# Patient Record
Sex: MALE | Race: WHITE | URBAN - METROPOLITAN AREA
[De-identification: names, ages, dates, MRNs, and addresses within clinical notes are randomized per-mention and may not be internally consistent; named-entity substitution may affect disease eponyms.]

---

## 2017-03-09 ENCOUNTER — EMERGENCY (EMERGENCY)
Facility: HOSPITAL | Age: 52
LOS: 1 days | Discharge: HOME | End: 2017-03-09
Attending: EMERGENCY MEDICINE | Admitting: EMERGENCY MEDICINE
Payer: COMMERCIAL

## 2017-03-09 VITALS
SYSTOLIC BLOOD PRESSURE: 188 MMHG | HEART RATE: 96 BPM | DIASTOLIC BLOOD PRESSURE: 114 MMHG | WEIGHT: 225.31 LBS | OXYGEN SATURATION: 99 % | RESPIRATION RATE: 20 BRPM | TEMPERATURE: 99 F

## 2017-03-09 VITALS
RESPIRATION RATE: 18 BRPM | TEMPERATURE: 98 F | SYSTOLIC BLOOD PRESSURE: 198 MMHG | HEART RATE: 92 BPM | OXYGEN SATURATION: 97 % | DIASTOLIC BLOOD PRESSURE: 99 MMHG

## 2017-03-09 DIAGNOSIS — M79.1 MYALGIA: ICD-10-CM
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
## 2017-03-09 DIAGNOSIS — T23.231A BURN OF SECOND DEGREE OF MULTIPLE RIGHT FINGERS (NAIL), NOT INCLUDING THUMB, INITIAL ENCOUNTER: ICD-10-CM
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
## 2017-03-09 DIAGNOSIS — M79.641 PAIN IN RIGHT HAND: ICD-10-CM
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
## 2017-03-09 DIAGNOSIS — R73.9 HYPERGLYCEMIA, UNSPECIFIED: ICD-10-CM

## 2017-03-09 LAB
ALBUMIN SERPL ELPH-MCNC: 5.1 G/DL — HIGH (ref 3.3–5)
ALP SERPL-CCNC: 73 U/L — SIGNIFICANT CHANGE UP (ref 40–120)
ALT FLD-CCNC: 57 U/L RC — HIGH (ref 10–45)
ANION GAP SERPL CALC-SCNC: 16 MMOL/L — SIGNIFICANT CHANGE UP (ref 5–17)
AST SERPL-CCNC: 28 U/L — SIGNIFICANT CHANGE UP (ref 10–40)
BASOPHILS # BLD AUTO: 0 K/UL — SIGNIFICANT CHANGE UP (ref 0–0.2)
BASOPHILS NFR BLD AUTO: 0.4 % — SIGNIFICANT CHANGE UP (ref 0–2)
BILIRUB SERPL-MCNC: 0.4 MG/DL — SIGNIFICANT CHANGE UP (ref 0.2–1.2)
BUN SERPL-MCNC: 16 MG/DL — SIGNIFICANT CHANGE UP (ref 7–23)
CALCIUM SERPL-MCNC: 10.1 MG/DL — SIGNIFICANT CHANGE UP (ref 8.4–10.5)
CHLORIDE SERPL-SCNC: 100 MMOL/L — SIGNIFICANT CHANGE UP (ref 96–108)
CK SERPL-CCNC: 107 U/L — SIGNIFICANT CHANGE UP (ref 30–200)
CO2 SERPL-SCNC: 24 MMOL/L — SIGNIFICANT CHANGE UP (ref 22–31)
CREAT SERPL-MCNC: 0.79 MG/DL — SIGNIFICANT CHANGE UP (ref 0.5–1.3)
EOSINOPHIL # BLD AUTO: 0.1 K/UL — SIGNIFICANT CHANGE UP (ref 0–0.5)
EOSINOPHIL NFR BLD AUTO: 1.3 % — SIGNIFICANT CHANGE UP (ref 0–6)
GLUCOSE SERPL-MCNC: 357 MG/DL — HIGH (ref 70–99)
HCT VFR BLD CALC: 44.5 % — SIGNIFICANT CHANGE UP (ref 39–50)
HGB BLD-MCNC: 15.8 G/DL — SIGNIFICANT CHANGE UP (ref 13–17)
LYMPHOCYTES # BLD AUTO: 2.5 K/UL — SIGNIFICANT CHANGE UP (ref 1–3.3)
LYMPHOCYTES # BLD AUTO: 28.3 % — SIGNIFICANT CHANGE UP (ref 13–44)
MCHC RBC-ENTMCNC: 30.2 PG — SIGNIFICANT CHANGE UP (ref 27–34)
MCHC RBC-ENTMCNC: 35.6 GM/DL — SIGNIFICANT CHANGE UP (ref 32–36)
MCV RBC AUTO: 84.8 FL — SIGNIFICANT CHANGE UP (ref 80–100)
MONOCYTES # BLD AUTO: 0.6 K/UL — SIGNIFICANT CHANGE UP (ref 0–0.9)
MONOCYTES NFR BLD AUTO: 6.3 % — SIGNIFICANT CHANGE UP (ref 2–14)
NEUTROPHILS # BLD AUTO: 5.7 K/UL — SIGNIFICANT CHANGE UP (ref 1.8–7.4)
NEUTROPHILS NFR BLD AUTO: 63.8 % — SIGNIFICANT CHANGE UP (ref 43–77)
PLATELET # BLD AUTO: 179 K/UL — SIGNIFICANT CHANGE UP (ref 150–400)
POTASSIUM SERPL-MCNC: 4.1 MMOL/L — SIGNIFICANT CHANGE UP (ref 3.5–5.3)
POTASSIUM SERPL-SCNC: 4.1 MMOL/L — SIGNIFICANT CHANGE UP (ref 3.5–5.3)
PROT SERPL-MCNC: 8.4 G/DL — HIGH (ref 6–8.3)
RBC # BLD: 5.25 M/UL — SIGNIFICANT CHANGE UP (ref 4.2–5.8)
RBC # FLD: 12.5 % — SIGNIFICANT CHANGE UP (ref 10.3–14.5)
SODIUM SERPL-SCNC: 140 MMOL/L — SIGNIFICANT CHANGE UP (ref 135–145)
WBC # BLD: 8.9 K/UL — SIGNIFICANT CHANGE UP (ref 3.8–10.5)
WBC # FLD AUTO: 8.9 K/UL — SIGNIFICANT CHANGE UP (ref 3.8–10.5)

## 2017-03-09 PROCEDURE — 16020 DRESS/DEBRID P-THICK BURN S: CPT

## 2017-03-09 PROCEDURE — 96374 THER/PROPH/DIAG INJ IV PUSH: CPT | Mod: XU

## 2017-03-09 PROCEDURE — 93005 ELECTROCARDIOGRAM TRACING: CPT | Mod: XU

## 2017-03-09 PROCEDURE — 73120 X-RAY EXAM OF HAND: CPT | Mod: 26,RT

## 2017-03-09 PROCEDURE — 82550 ASSAY OF CK (CPK): CPT

## 2017-03-09 PROCEDURE — 85027 COMPLETE CBC AUTOMATED: CPT

## 2017-03-09 PROCEDURE — 99285 EMERGENCY DEPT VISIT HI MDM: CPT | Mod: 25

## 2017-03-09 PROCEDURE — 73120 X-RAY EXAM OF HAND: CPT

## 2017-03-09 PROCEDURE — 93010 ELECTROCARDIOGRAM REPORT: CPT | Mod: 59

## 2017-03-09 PROCEDURE — 80053 COMPREHEN METABOLIC PANEL: CPT

## 2017-03-09 RX ORDER — SODIUM CHLORIDE 9 MG/ML
1000 INJECTION INTRAMUSCULAR; INTRAVENOUS; SUBCUTANEOUS ONCE
Qty: 0 | Refills: 0 | Status: COMPLETED | OUTPATIENT
Start: 2017-03-09 | End: 2017-03-09

## 2017-03-09 RX ORDER — BACITRACIN ZINC 500 UNIT/G
1 OINTMENT IN PACKET (EA) TOPICAL ONCE
Qty: 0 | Refills: 0 | Status: COMPLETED | OUTPATIENT
Start: 2017-03-09 | End: 2017-03-09

## 2017-03-09 RX ORDER — KETOROLAC TROMETHAMINE 30 MG/ML
15 SYRINGE (ML) INJECTION ONCE
Qty: 0 | Refills: 0 | Status: DISCONTINUED | OUTPATIENT
Start: 2017-03-09 | End: 2017-03-09

## 2017-03-09 RX ADMIN — SODIUM CHLORIDE 4000 MILLILITER(S): 9 INJECTION INTRAMUSCULAR; INTRAVENOUS; SUBCUTANEOUS at 11:55

## 2017-03-09 RX ADMIN — Medication 15 MILLIGRAM(S): at 12:23

## 2017-03-09 RX ADMIN — Medication 15 MILLIGRAM(S): at 12:42

## 2017-03-09 RX ADMIN — SODIUM CHLORIDE 4000 MILLILITER(S): 9 INJECTION INTRAMUSCULAR; INTRAVENOUS; SUBCUTANEOUS at 12:42

## 2017-03-09 RX ADMIN — Medication 1 APPLICATION(S): at 12:23

## 2017-03-09 RX ADMIN — SODIUM CHLORIDE 2000 MILLILITER(S): 9 INJECTION INTRAMUSCULAR; INTRAVENOUS; SUBCUTANEOUS at 13:34

## 2017-03-09 NOTE — ED PROVIDER NOTE - ATTENDING CONTRIBUTION TO CARE
I performed a history and physical exam of the patient and discussed their management with the resident. I reviewed the resident's note and agree with the documented findings and plan of care. My medical decison making and observations are found above.  \ I performed a history and physical exam of the patient and discussed their management with the resident. I reviewed the resident's note and agree with the documented findings and plan of care. My medical decision making and observations are found above.  \
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
## 2017-03-09 NOTE — ED PROVIDER NOTE - SKIN, MLM
patchy white skin over digits 1-5 on dorsum, blanching, and tender with pressure. blanching dry eryhtema at wrist 5cm in diameter
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
## 2017-03-09 NOTE — ED PROVIDER NOTE - MUSCULOSKELETAL, MLM
decreased strenght on extension of 4th and 5th digits.  tender over dorsal digits 1-5 and dorsal wrist
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
## 2020-04-30 NOTE — ED ADULT NURSE NOTE - TEMPERATURE IN CELSIUS (DEGREES C)
Patient:   MIGDALIA FRANKLIN            MRN: [id number]            FIN: [id number]              Age:   67 years     Sex:  MALE     :  48   Associated Diagnoses:   None   Author:   CARA, DEONNA BROWN CONSULTATION        Basic Information   Source of history:  Self, Not family member.      History of Present Illness             The patient presents with 66 yo M PMH A fib, presents with progressive 2w history of dyspnea; this has been associated with a dry cough with coughing spells.  No fevers, chills.  Further ROS +BLE edema.  No abdominal pain but did have transient lower R chest pain.  No known exposure to covid.  In ER, no feer, WBC 4.4, CRP3, pct .18, d dimer 2.19, trop .13; bili noted to be 5.7 on admission; now zana to 2,2.  CXR with pulmonary vascular  congestion; some focal changes noted in RLL and RICCARDO. Patient given some diuresis; feeling better at present; down to 2L of O2.  Covid testing pending. .       Review of Systems   Constitutional:  Negative.    Eye:  Negative.    Ear/Nose/Mouth/Throat:  Negative.    Respiratory:  Negative.    Cardiovascular:  Negative.    Gastrointestinal:  Negative.    Genitourinary:  Negative.    Hematology/Lymphatics:  Negative.    Endocrine:  Negative.    Immunologic:  Negative.    Musculoskeletal:  Negative.    Integumentary:  Negative.    Neurologic:  Negative.    Psychiatric:  Negative.      Health Status   Allergies:    Allergies (1) Active Reaction  NKA None Documented    Current medications:    Medications (13) Active  Scheduled: (9)  *Potassium Protocol Communication  1 each, N/A, Daily  ApixaBAN 5 mg tab  5 mg 1 tab, Oral, Q12H  Carvedilol 12.5 mg tab  12.5 mg 1 tab, Oral, Daily  cefTRIAXone  1,000 mg 10 mL, Slow IV Push, Q24H  Doxycycline hyclate 100 mg cap  100 mg 1 cap, Oral, BID  Furosemide 20 mg/2 mL inj SDV  20 mg 2 mL, Slow IV Push, Daily  Lisinopril 20 mg tab  20 mg 1 tab, Oral, Daily  MetFORMIN 500 mg tab  500 mg 1 tab, Oral, Daily [with  breakfast]  Potassium CHLORIDE 20 mEq ER tab  40 mEq 2 tab, Oral, Q4H (variable)  Continuous: (0)  PRN: (4)  Acetaminophen 325 mg tab  650 mg 2 tab, Oral, Q6H  Docusate sodium 100 mg cap  100 mg 1 cap, Oral, BID  Hydrocodone-acetaminophen 5-325 mg tab  1 tab, Oral, Q6H  Ondansetron 4 mg/2 mL inj SDV  4 mg 2 mL, Slow IV Push, Q8H    Home medications  Home Medications (8) Active  carvedilol oral 12.5 mg tablet 12.5 mg = 1 tab, Oral, Daily  Eliquis oral 5 mg tablet 5 mg = 1 tab, Oral, Q12H  Lasix oral 20 mg tablet 20 mg = 1 tab, Oral, BID  lisinopril oral 20 mg tablet 20 mg = 1 tab, Oral, Daily  metFORMIN oral 500 mg ER tablet 500 mg = 1 tab, Oral, Daily  metoprolol succinate oral 25 mg XL tablet 12.5 mg = 0.5 tab, Oral, Daily  NIFEdipine oral 60 mg XL tablet 60 mg = 1 tab, Oral, Daily  omeprazole 40 mg oral delayed release capsule 40 mg = 1 cap, Oral, Daily       Histories   Past Medical History: Problem List / Past Medical History   Angina at rest  Dyspnea  GERD - Gastro-esophageal reflux disease  HTN (hypertension)  Hyperlipidemia  Nonsustained ventricular tachycardia  Risk factors for obstructive sleep apnea    Social History       Alcohol  Details: Alcohol Abuse in Household: No.  Use: None.  Exercise  Details: Exercise: Never.  Sexual  Details: Sexual orientation: Straight or heterosexual.  Gender Identity: Identifies as male.  Gender on Ins: Male.  Preferred Pronouns: Male.  Male Birth Sex:.  Substance Abuse  Details: Substance Abuse in Household: No.  Use: None.  Tobacco  Details: Smoker in Houshold: No.  Smoked/Smokeless Tobacco Last 30 Days: No.  Smoking Tobacco Use: Never smoker.  Smokeless Tobacco Use Never.  Details: Smoked/Smokeless Tobacco Last 30 Days: No.  Use: Never smoker.  Cultural/Baptism Practices  Details: Baptism or Cultural Practices While in Hospital: No.  ADLS:         Patient Lives:      .     Family history  MOTHER: Liver cancer  SISTER: CA - Cancer of colon  Procedure history  No  qualifying data available.        Health Maintenance   Immunizations:  Up to date per patient.    Vitals between:   29-APR-2020 11:34:35   TO   30-APR-2020 11:34:35                   LAST RESULT MINIMUM MAXIMUM  Temperature 36.7 36.4 36.9  Heart Rate 53 50 67  Respiratory Rate 24 15 24  NISBP           164 120 169  NIDBP           96 66 96  NIMBP           111 84 111  SpO2                    95 92 99        Review / Management   Results review:     Labs between:  29-APR-2020 11:31 to 30-APR-2020 11:31  CBC:                 WBC  HgB  Hct  Plt  MCV  RDW   30-APR-2020 4.4  (L) 11.2  (L) 33.6  (L) 122  90.6  (H) 16.2   29-APR-2020 5.7  (L) 12.9  (L) 38.5  (L) 120  89.5  (H) 15.9   DIFF:                 Seg  Neutroph//ABS  Lymph//ABS  Mono//ABS  EOS/ABS  30-APR-2020 NOT APPLICABLE  44 // 1.9 42 // 1.8 12 // 0.5 1 // 0.1  29-APR-2020 NOT APPLICABLE  59 // 3.4 29 // 1.7 10 // 0.6 1 // (L) 0.0  BMP:                 Na  Cl  BUN  Glu   30-APR-2020 142  (H) 108  17  81                              K  CO2  Cr  Ca                              (L) 3.1  22  (H) 1.26  8.7   BMP:                 Na  Cl  BUN  Glu   29-APR-2020 143  107  18  (H) 147                              K  CO2  Cr  Ca                              3.6  22  (H) 1.18  9.3   CMP:                 AST  ALT  AlkPhos  Bili  Albumin   30-APR-2020 (H) 70  (H) 76  (H) 337  (H) 2.2  (L) 3.0   29-APR-2020 (H) 105  (H) 102  (H) 436  (H) 5.7  3.9   Other Chem:             Mg  Phos  Triglycerides  GGTP  DirectBili                           (L) 1.6           POC GLU:                 Latest Result  Latest Date  Minimum  Min Date  Maximum  Max Date                             81 30-APR-2020 81 30-APR-2020 (H) 104  29-APR-2020  COAG:                 INR  PT  PTT  Ddimer  Fibrinogen    30-APR-2020       (H) 2.19     29-APR-2020       (H) 2.25                    .    Radiology results   Result title:  XR CHEST 1V  Result status:  Final  Verified by:  SRINATH, SHAYNE on 04/29/2020  6:13  IMPRESSION: There is interval development of a small right pleural effusion and right lower lobe infiltrate.  Pulmonary vascular congestion, cardiomegaly and aortic tortuosity are again noted.  Linear opacity in the left upper lobe.  No pneumothorax.     Lines and Tubes:  Lines, Tubes, and Drains   PIVs   Hand Left inserted 1 day ago.   .         Chest tube: Chest Tubes  No currently active chest tubes have been documented on in the previous 24 hours.  .    Micro:  Covid pending        Impression and Plan   -Acute respiratory failure; suspect CHF but can't r/o covid with supporting lab data  -A fib  -H/O CHF  -Elevated bili, ?congestion; unusual for covid,improved  -DM  -HTN  REC:  -Change abx to doxycycline, rocephin for now  -Await covid testing results  -Maintain full covid isolation precautions for now  -US RUQ pending  -Monitor temps, cbc, oxygenation closely  -Ween O2 as able  -Repeat CRP, ferritin, d d elicia in AM  -Cardiology followin  -Will follow   37

## 2022-02-02 NOTE — ED ADULT NURSE NOTE - EENT ASSESSMENT, MLM
[facility] Hemigard Postcare Instructions: The HEMIGARD strips are to remain completely dry for at least 5-7 days.
